# Patient Record
Sex: FEMALE | Race: WHITE
[De-identification: names, ages, dates, MRNs, and addresses within clinical notes are randomized per-mention and may not be internally consistent; named-entity substitution may affect disease eponyms.]

---

## 2017-09-29 ENCOUNTER — HOSPITAL ENCOUNTER (OUTPATIENT)
Dept: HOSPITAL 47 - RADMAMWWP | Age: 42
End: 2017-09-29
Payer: COMMERCIAL

## 2017-09-29 DIAGNOSIS — Z12.31: Primary | ICD-10-CM

## 2017-09-29 PROCEDURE — 77063 BREAST TOMOSYNTHESIS BI: CPT

## 2017-10-02 NOTE — MM
Reason for exam: screening  (asymptomatic).

Last mammogram was performed 11 months ago.



History:

Family history of breast cancer in maternal aunt at age 40 and breast cancer in 

maternal grandmother.



Physical Findings:

A clinical breast exam by your physician is recommended on an annual basis and 

results should be correlated with mammographic findings.



MG 3D Screening Mammo W/Cad

Bilateral CC and MLO view(s) were taken.

Prior study comparison: October 24, 2016, right breast MG 3d diag mammo w/cad RT. 

April 22, 2016, right breast MG 3d work up w/cad RT.  April 15, 2016, bilateral 

MG screening mammo w CAD.  April 9, 2015, bilateral MG screening mammo w CAD.  May

17, 2013, bilateral digital screening mammo w/CAD.

The breast tissue is heterogeneously dense. This may lower the sensitivity of 

mammography.  There is no discrete abnormality.





ASSESSMENT: Negative, BI-RAD 1



RECOMMENDATION:

Routine screening mammogram of both breasts in 1 year.

## 2018-10-25 ENCOUNTER — HOSPITAL ENCOUNTER (OUTPATIENT)
Dept: HOSPITAL 47 - RADMAMWWP | Age: 43
Discharge: HOME | End: 2018-10-25
Payer: COMMERCIAL

## 2018-10-25 DIAGNOSIS — Z12.31: Primary | ICD-10-CM

## 2018-10-25 PROCEDURE — 77067 SCR MAMMO BI INCL CAD: CPT

## 2018-10-25 PROCEDURE — 77063 BREAST TOMOSYNTHESIS BI: CPT

## 2018-10-29 NOTE — MM
Reason for exam: screening  (asymptomatic).

Last mammogram was performed 1 year and 1 month ago.



History:

Family history of breast cancer in maternal aunt at age 40 and breast cancer in 

maternal grandmother.



Physical Findings:

A clinical breast exam by your physician is recommended on an annual basis and 

results should be correlated with mammographic findings.



MG 3D Screening Mammo W/Cad

Bilateral CC and MLO view(s) were taken.

Prior study comparison: September 29, 2017, bilateral MG 3d screening mammo w/cad.

October 24, 2016, right breast MG 3d diag mammo w/cad RT.

The breast tissue is heterogeneously dense. This may lower the sensitivity of 

mammography.  No suspicious calcifications are seen. New nodular densities outer 

central right breast 8-9cm from nipple.

This finding is changed when compared with previous exams.





ASSESSMENT: Incomplete: need additional imaging evaluation, BI-RAD 0



RECOMMENDATION:

Special view mammogram and ultrasound of the right breast.



[facility] will attempt to contact patient to return for supplemental 

views and ultrasound.

## 2018-10-31 ENCOUNTER — HOSPITAL ENCOUNTER (OUTPATIENT)
Dept: HOSPITAL 47 - RADMAMWWP | Age: 43
Discharge: HOME | End: 2018-10-31
Payer: COMMERCIAL

## 2018-10-31 DIAGNOSIS — R92.8: Primary | ICD-10-CM

## 2018-10-31 PROCEDURE — 77065 DX MAMMO INCL CAD UNI: CPT

## 2018-10-31 PROCEDURE — 77061 BREAST TOMOSYNTHESIS UNI: CPT

## 2018-11-01 NOTE — MM
Reason for exam: additional evaluation requested from abnormal screening.

Last mammogram was performed less than 1 month ago.



History:

Family history of breast cancer in maternal aunt at age 40 and breast cancer in 

maternal grandmother.



Physical Findings:

Nurse Summary: 1 x 1cm nodule in the right breast at 2 o'clock (nurse ts).



MG 3D Work Up W/Cad RT

Spot compression CC, spot compression MLO, and LM view(s) were taken of the right 

breast.

Prior study comparison: October 25, 2018, bilateral MG 3d screening mammo w/cad.  

September 29, 2017, bilateral MG 3d screening mammo w/cad.

The breast tissue is heterogeneously dense. This may lower the sensitivity of 

mammography.  Persistent nodularity in the right breast. Ultrasound recommended.



These results were verbally communicated with the patient and result sheet given 

to the patient on 10/31/18.





ASSESSMENT: Incomplete: need additional imaging evaluation, BI-RAD 0



RECOMMENDATION:

Ultrasound of the right breast.

## 2018-11-01 NOTE — USB
Reason for exam: additional evaluation requested from abnormal screening.



History:

Family history of breast cancer in maternal aunt at age 40 and breast cancer in 

maternal grandmother.



US Breast Workup RT

Right complete breast ultrasound includes all four quadrants, the retroareolar 

region and axilla. Finding demonstrates a 8 x 5 x 11mm cystic cluster at 4 o'clock

BB, a 1.2 x 0.6 x 1.0cm cystic lesion at 7 o'clock, a 0.6 x 0.3 x 0.5cm lesion at

7 o'clock and a 0.7 x 0.4 x 0.7cm cystic lesion at 9 o'clock. Multiple cysts 

visualized.



These results were verbally communicated with the patient and result sheet given 

to the patient on 10/31/18.





ASSESSMENT: Benign, BI-RAD 2



RECOMMENDATION:

Return to routine screening mammogram schedule for both breasts.

## 2019-11-14 ENCOUNTER — HOSPITAL ENCOUNTER (OUTPATIENT)
Dept: HOSPITAL 47 - RADMAMWWP | Age: 44
Discharge: HOME | End: 2019-11-14
Attending: FAMILY MEDICINE
Payer: COMMERCIAL

## 2019-11-14 DIAGNOSIS — R92.8: ICD-10-CM

## 2019-11-14 DIAGNOSIS — Z12.31: Primary | ICD-10-CM

## 2019-11-14 PROCEDURE — 77063 BREAST TOMOSYNTHESIS BI: CPT

## 2019-11-14 PROCEDURE — 77067 SCR MAMMO BI INCL CAD: CPT

## 2019-11-15 NOTE — MM
Reason for exam: screening  (asymptomatic).

Last mammogram was performed 1 year ago.



History:

Family history of breast cancer in maternal aunt at age 40 and breast cancer in 

maternal grandmother.



Physical Findings:

A clinical breast exam by your physician is recommended on an annual basis and 

results should be correlated with mammographic findings.



MG 3D Screening Mammo W/Cad

Bilateral CC and MLO view(s) were taken.

Prior study comparison: October 31, 2018, right breast MG 3d work up w/cad RT.  

October 25, 2018, bilateral MG 3d screening mammo w/cad.

The breast tissue is heterogeneously dense. This may lower the sensitivity of 

mammography.  No suspicious calcifications are seen. Increasing nodualrity upper 

outer right breast.

This finding is changed when compared with previous exams.





ASSESSMENT: Incomplete: need additional imaging evaluation, BI-RAD 0



RECOMMENDATION:

Special view mammogram and ultrasound of the right breast.



[facility] will attempt to contact patient to return for supplemental 

views and ultrasound.

## 2019-11-29 ENCOUNTER — HOSPITAL ENCOUNTER (OUTPATIENT)
Dept: HOSPITAL 47 - RADMAMWWP | Age: 44
End: 2019-11-29
Attending: FAMILY MEDICINE
Payer: COMMERCIAL

## 2019-11-29 DIAGNOSIS — R92.8: Primary | ICD-10-CM

## 2019-11-29 DIAGNOSIS — Z80.3: ICD-10-CM

## 2019-11-29 PROCEDURE — 77065 DX MAMMO INCL CAD UNI: CPT

## 2019-11-29 PROCEDURE — 77061 BREAST TOMOSYNTHESIS UNI: CPT

## 2019-12-02 NOTE — USB
Reason for exam: additional evaluation requested from abnormal screening.



History:

Family history of breast cancer in maternal aunt at age 40 and breast cancer in 

maternal grandmother.



US Breast Workup RT

Right complete breast ultrasound includes all four quadrants, the retroareolar 

region and axilla. Finding demonstrates ductal ectasia at 1 o'clock, a 0.7 x 0.7 x

0.3cm oval, cystic, cluster at 4 o'clock, a 0.3 x 0.4 x 0.2cm oval, cystic lesion

at 6 o'clock, a 0.8 x 0.7 x 0.4cm oval, cystic lesion at 7 o'clock, a 0.6 x 0.5 x

0.4cm oval, complex, cystic lesion at 8 o'clock, a 0.9 x 0.8 x 0.5cm oval, 

complex, cystic lesion at 10 o'clock, a 0.8 x 0.8 x 0.5cm cystic lesion at 10 

o'clock, a 0.3 x 0.4 x 0.2cm cystic lesion at the posterior nipple and a 1.8 x 1.7

x 0.9cm axilla node.



These results were verbally communicated with the patient and result sheet given 

to the patient on 11/29/19.





ASSESSMENT: Benign, BI-RAD 2



RECOMMENDATION:

Return to routine screening mammogram schedule for both breasts.

## 2019-12-02 NOTE — MM
Reason for exam: additional evaluation requested from abnormal screening.

Last mammogram was performed less than 1 month ago.



History:

Family history of breast cancer in maternal aunt at age 40 and breast cancer in 

maternal grandmother.



Physical Findings:

Nurse did not find any significant physical abnormalities on exam.



MG 3D Work Up W/Cad RT

Spot compression CC, spot compression MLO, and ML view(s) were taken of the 
right 

breast.

Prior study comparison: November 14, 2019, bilateral MG 3d screening mammo 
w/cad. 

October 31, 2018, right breast MG 3d work up w/cad RT.

The breast tissue is heterogeneously dense. This may lower the sensitivity of 

mammography.  There is changing nodularity in the right breast, multiple 
present.



These results were verbally communicated with the patient and result sheet given


to the patient on 11/29/19.





ASSESSMENT: Incomplete: need additional imaging evaluation, BI-RAD 0



RECOMMENDATION:

Ultrasound of the right breast.

[facility]

## 2021-01-27 ENCOUNTER — HOSPITAL ENCOUNTER (OUTPATIENT)
Dept: HOSPITAL 47 - RADMAMWWP | Age: 46
Discharge: HOME | End: 2021-01-27
Attending: FAMILY MEDICINE
Payer: COMMERCIAL

## 2021-01-27 DIAGNOSIS — Z12.31: Primary | ICD-10-CM

## 2021-01-27 PROCEDURE — 77067 SCR MAMMO BI INCL CAD: CPT

## 2021-01-27 PROCEDURE — 77063 BREAST TOMOSYNTHESIS BI: CPT

## 2022-04-12 ENCOUNTER — HOSPITAL ENCOUNTER (OUTPATIENT)
Dept: HOSPITAL 47 - RADMAMWWP | Age: 47
Discharge: HOME | End: 2022-04-12
Attending: OBSTETRICS & GYNECOLOGY
Payer: COMMERCIAL

## 2022-04-12 DIAGNOSIS — Z12.31: Primary | ICD-10-CM

## 2022-04-12 DIAGNOSIS — Z80.3: ICD-10-CM

## 2022-04-12 PROCEDURE — 77067 SCR MAMMO BI INCL CAD: CPT

## 2022-04-12 PROCEDURE — 77063 BREAST TOMOSYNTHESIS BI: CPT

## 2022-04-14 ENCOUNTER — HOSPITAL ENCOUNTER (OUTPATIENT)
Dept: HOSPITAL 47 - RADUSWWP | Age: 47
Discharge: HOME | End: 2022-04-14
Attending: OBSTETRICS & GYNECOLOGY
Payer: COMMERCIAL

## 2022-04-14 DIAGNOSIS — R92.8: Primary | ICD-10-CM

## 2022-04-14 DIAGNOSIS — Z80.3: ICD-10-CM

## 2023-01-02 ENCOUNTER — HOSPITAL ENCOUNTER (OUTPATIENT)
Dept: HOSPITAL 47 - RADMAMWWP | Age: 48
Discharge: HOME | End: 2023-01-02
Attending: OBSTETRICS & GYNECOLOGY
Payer: COMMERCIAL

## 2023-01-02 DIAGNOSIS — R92.8: Primary | ICD-10-CM

## 2023-01-02 DIAGNOSIS — Z80.3: ICD-10-CM

## 2023-01-02 PROCEDURE — 77061 BREAST TOMOSYNTHESIS UNI: CPT

## 2023-01-02 PROCEDURE — 77065 DX MAMMO INCL CAD UNI: CPT

## 2023-03-10 ENCOUNTER — HOSPITAL ENCOUNTER (OUTPATIENT)
Dept: HOSPITAL 47 - LABPAT | Age: 48
Discharge: HOME | End: 2023-03-10
Attending: OBSTETRICS & GYNECOLOGY
Payer: COMMERCIAL

## 2023-03-10 DIAGNOSIS — Z53.9: Primary | ICD-10-CM

## 2023-03-24 ENCOUNTER — HOSPITAL ENCOUNTER (OUTPATIENT)
Dept: HOSPITAL 47 - OR | Age: 48
Discharge: HOME | End: 2023-03-24
Attending: OBSTETRICS & GYNECOLOGY
Payer: COMMERCIAL

## 2023-03-24 VITALS — TEMPERATURE: 98.4 F

## 2023-03-24 VITALS — SYSTOLIC BLOOD PRESSURE: 139 MMHG | HEART RATE: 68 BPM | DIASTOLIC BLOOD PRESSURE: 76 MMHG | RESPIRATION RATE: 17 BRPM

## 2023-03-24 VITALS — BODY MASS INDEX: 30.7 KG/M2

## 2023-03-24 DIAGNOSIS — Z98.890: ICD-10-CM

## 2023-03-24 DIAGNOSIS — I10: ICD-10-CM

## 2023-03-24 DIAGNOSIS — D69.3: ICD-10-CM

## 2023-03-24 DIAGNOSIS — Z79.899: ICD-10-CM

## 2023-03-24 DIAGNOSIS — N93.8: Primary | ICD-10-CM

## 2023-03-24 PROCEDURE — 58563 HYSTEROSCOPY ABLATION: CPT

## 2023-03-24 PROCEDURE — 81025 URINE PREGNANCY TEST: CPT

## 2023-03-24 PROCEDURE — 88305 TISSUE EXAM BY PATHOLOGIST: CPT

## 2023-05-03 ENCOUNTER — HOSPITAL ENCOUNTER (OUTPATIENT)
Dept: HOSPITAL 47 - RADMAMWWP | Age: 48
Discharge: HOME | End: 2023-05-03
Attending: OBSTETRICS & GYNECOLOGY
Payer: COMMERCIAL

## 2023-05-03 DIAGNOSIS — Z80.3: ICD-10-CM

## 2023-05-03 DIAGNOSIS — Z12.31: Primary | ICD-10-CM

## 2023-05-03 PROCEDURE — 77063 BREAST TOMOSYNTHESIS BI: CPT

## 2023-05-03 PROCEDURE — 77067 SCR MAMMO BI INCL CAD: CPT

## 2023-10-09 ENCOUNTER — HOSPITAL ENCOUNTER (OUTPATIENT)
Dept: HOSPITAL 47 - LABWHC1 | Age: 48
Discharge: HOME | End: 2023-10-09
Attending: STUDENT IN AN ORGANIZED HEALTH CARE EDUCATION/TRAINING PROGRAM
Payer: COMMERCIAL

## 2023-10-09 DIAGNOSIS — I10: Primary | ICD-10-CM

## 2023-10-09 LAB
ALBUMIN SERPL-MCNC: 4.4 D/DL (ref 3.8–4.9)
ALBUMIN/GLOB SERPL: 1.91 RATIO (ref 1.6–3.17)
ALP SERPL-CCNC: 102 U/L (ref 41–126)
ALT SERPL-CCNC: 18 U/L (ref 8–44)
ANION GAP SERPL CALC-SCNC: 12.4 MMOL/L (ref 4–12)
AST SERPL-CCNC: 19 U/L (ref 13–35)
BASOPHILS # BLD AUTO: 0.03 X 10*3/UL (ref 0–0.1)
BASOPHILS NFR BLD AUTO: 0.6 %
BUN SERPL-SCNC: 7.3 MG/DL (ref 9–27)
BUN/CREAT SERPL: 9.12 RATIO (ref 12–20)
CALCIUM SPEC-MCNC: 9.1 MG/DL (ref 8.7–10.3)
CHLORIDE SERPL-SCNC: 104 MMOL/L (ref 96–109)
CHOLEST SERPL-MCNC: 155 MG/DL (ref 0–200)
CO2 SERPL-SCNC: 22.6 MMOL/L (ref 21.6–31.8)
EOSINOPHIL # BLD AUTO: 0.16 X 10*3/UL (ref 0.04–0.35)
EOSINOPHIL NFR BLD AUTO: 3 %
ERYTHROCYTE [DISTWIDTH] IN BLOOD BY AUTOMATED COUNT: 4.24 X 10*6/UL (ref 4.1–5.2)
ERYTHROCYTE [DISTWIDTH] IN BLOOD: 12.5 % (ref 11.5–14.5)
GLOBULIN SER CALC-MCNC: 2.3 D/DL (ref 1.6–3.3)
GLUCOSE SERPL-MCNC: 91 MG/DL (ref 70–110)
HCT VFR BLD AUTO: 38.9 % (ref 37.2–46.3)
HDLC SERPL-MCNC: 39.1 MG/DL (ref 40–60)
HGB BLD-MCNC: 13.1 D/DL (ref 12–15)
IMM GRANULOCYTES BLD QL AUTO: 0.2 %
LDLC SERPL CALC-MCNC: 84.3 MG/DL (ref 0–131)
LYMPHOCYTES # SPEC AUTO: 1.88 X 10*3/UL (ref 0.9–5)
LYMPHOCYTES NFR SPEC AUTO: 35.3 %
MCH RBC QN AUTO: 30.9 PG (ref 27–32)
MCHC RBC AUTO-ENTMCNC: 33.7 D/DL (ref 32–37)
MCV RBC AUTO: 91.7 FL (ref 80–97)
MONOCYTES # BLD AUTO: 0.33 X 10*3/UL (ref 0.2–1)
MONOCYTES NFR BLD AUTO: 6.2 %
NEUTROPHILS # BLD AUTO: 2.92 X 10*3/UL (ref 1.8–7.7)
NEUTROPHILS NFR BLD AUTO: 54.7 %
NRBC BLD AUTO-RTO: 0 X 10*3/UL (ref 0–0.01)
PLATELET # BLD AUTO: 156 X 10*3/UL (ref 140–440)
POTASSIUM SERPL-SCNC: 4.6 MMOL/L (ref 3.5–5.5)
PROT SERPL-MCNC: 6.7 D/DL (ref 6.2–8.2)
SODIUM SERPL-SCNC: 139 MMOL/L (ref 135–145)
TRIGL SERPL-MCNC: 158 MG/DL (ref 0–149)
VLDLC SERPL CALC-MCNC: 31.6 MG/DL (ref 5–40)
WBC # BLD AUTO: 5.33 X 10*3/UL (ref 4.5–10)

## 2023-10-09 PROCEDURE — 80061 LIPID PANEL: CPT

## 2023-10-09 PROCEDURE — 82306 VITAMIN D 25 HYDROXY: CPT

## 2023-10-09 PROCEDURE — 83036 HEMOGLOBIN GLYCOSYLATED A1C: CPT

## 2023-10-09 PROCEDURE — 84443 ASSAY THYROID STIM HORMONE: CPT

## 2023-10-09 PROCEDURE — 85025 COMPLETE CBC W/AUTO DIFF WBC: CPT

## 2023-10-09 PROCEDURE — 36415 COLL VENOUS BLD VENIPUNCTURE: CPT

## 2023-10-09 PROCEDURE — 80053 COMPREHEN METABOLIC PANEL: CPT

## 2024-03-07 ENCOUNTER — HOSPITAL ENCOUNTER (OUTPATIENT)
Dept: HOSPITAL 47 - RADMAMWWP | Age: 49
Discharge: HOME | End: 2024-03-07
Attending: OBSTETRICS & GYNECOLOGY
Payer: COMMERCIAL

## 2024-03-07 DIAGNOSIS — R92.331: Primary | ICD-10-CM

## 2024-03-07 DIAGNOSIS — Z80.3: ICD-10-CM

## 2024-03-07 PROCEDURE — 77061 BREAST TOMOSYNTHESIS UNI: CPT

## 2024-03-07 PROCEDURE — 77065 DX MAMMO INCL CAD UNI: CPT

## 2024-10-31 ENCOUNTER — HOSPITAL ENCOUNTER (OUTPATIENT)
Dept: HOSPITAL 47 - RADMAMWWP | Age: 49
Discharge: HOME | End: 2024-10-31
Attending: OBSTETRICS & GYNECOLOGY
Payer: COMMERCIAL

## 2024-10-31 PROCEDURE — 77063 BREAST TOMOSYNTHESIS BI: CPT

## 2024-10-31 PROCEDURE — 77067 SCR MAMMO BI INCL CAD: CPT

## 2025-03-31 ENCOUNTER — HOSPITAL ENCOUNTER (OUTPATIENT)
Dept: HOSPITAL 47 - RADMAMWWP | Age: 50
Discharge: HOME | End: 2025-03-31
Attending: OBSTETRICS & GYNECOLOGY
Payer: COMMERCIAL

## 2025-03-31 DIAGNOSIS — Z53.9: Primary | ICD-10-CM
